# Patient Record
(demographics unavailable — no encounter records)

---

## 2024-12-11 NOTE — PHYSICAL EXAM
[Alert] : alert [Normocephalic] : normocephalic [Flat Open Anterior [place]] : flat open anterior fontanelle [PERRL] : PERRL [Red Reflex Bilateral] : red reflex bilateral [Normally Placed Ears] : normally placed ears [Auricles Well Formed] : auricles well formed [Clear Tympanic membranes] : clear tympanic membranes [Light reflex present] : light reflex present [Bony structures visible] : bony structures visible [Patent Auditory Canal] : patent auditory canal [Nares Patent] : nares patent [Palate Intact] : palate intact [Uvula Midline] : uvula midline [Supple, full passive range of motion] : supple, full passive range of motion [Symmetric Chest Rise] : symmetric chest rise [Clear to Auscultation Bilaterally] : clear to auscultation bilaterally [Regular Rate and Rhythm] : regular rate and rhythm [S1, S2 present] : S1, S2 present [+2 Femoral Pulses] : +2 femoral pulses [Soft] : soft [Bowel Sounds] : bowel sounds present [Umbilical Stump Dry, Clean, Intact] : umbilical stump dry, clean, intact [Normal external genitalia] : normal external genitalia [Patent Vagina] : patent vagina [Patent] : patent [Normally Placed] : normally placed [No Abnormal Lymph Nodes Palpated] : no abnormal lymph nodes palpated [Symmetric Flexed Extremities] : symmetric flexed extremities [Startle Reflex] : startle reflex present [Suck Reflex] : suck reflex present [Rooting] : rooting reflex present [Palmar Grasp] : palmar grasp present [Plantar Grasp] : plantar reflex present [Symmetric [place]] : symmetric [place] [Acute Distress] : no acute distress [Icteric sclera] : nonicteric sclera [Discharge] : no discharge [Palpable Masses] : no palpable masses [Tender] : nontender [Distended] : not distended [Hepatomegaly] : no hepatomegaly [Splenomegaly] : no splenomegaly [Clitoromegaly] : no clitoromegaly [Fisher-Ortolani] : negative Fisher-Ortolani [Spinal Dimple] : no spinal dimple [Tuft of Hair] : no tuft of hair [Jaundice] : not jaundice

## 2024-12-11 NOTE — HISTORY OF PRESENT ILLNESS
[Born at ___ Wks Gestation] : The patient was born at [unfilled] weeks gestation [] : via normal spontaneous vaginal delivery [[facility]] : at [facility] [BW: _____] : weight of [unfilled] [Length: _____] : length of [unfilled] [HC: _____] : head circumference of [unfilled] [DW: _____] : Discharge weight was [unfilled] [Age: ___] : [unfilled] year old mother [G: ___] : G [unfilled] [P: ___] : P [unfilled] [Significant Hx: ____] : The mother's  medical history is significant for [unfilled] [None] : There are no risk factors [Yes] : Yes [Breast milk] : breast milk [(1) _____] : [unfilled] [(5) _____] : [unfilled] [GBS] : GBS positive [MBT: ____] : MBT - [unfilled] [Antibiotics: ______] : antibiotics ([unfilled]) [TcB: _____] : Transcutaneous Bilirubin [unfilled] mg/dL [Other: ____] : [unfilled] [Hours between feeds ___] : Child is fed every [unfilled] hours [___ voids per day] : [unfilled] voids per day [Frequency of stools: ___] : Frequency of stools: [unfilled]  stools [per day] : per day. [Yellow] : yellow [In Bassinet/Crib] : sleeps in bassinet/crib [On back] : sleeps on back [Pacifier] : Uses pacifier [No] : No cigarette smoke exposure [Rear facing car seat in back seat] : Rear facing car seat in back seat [Carbon Monoxide Detectors] : Carbon monoxide detectors at home [Smoke Detectors] : Smoke detectors at home. [NO] : No [RSV vaccine] : RSV vaccine not received by mother at least 14 days prior to delivery [HepBsAG] : HepBsAg negative [HIV] : HIV negative [Rubella (Immune)] : Rubella not immune [VDRL/RPR (Reactive)] : VDRL/RPR nonreactive [] : negative [FreeTextEntry9] : O+ [de-identified] : 24 [FreeTextEntry8] : MURMUR HEARD AFTER 24HOL CARDIO F/U LATER THIS WEEK [Co-sleeping] : no co-sleeping [Loose bedding, pillow, toys, and/or bumpers in crib] : no loose bedding, pillow, toys, and/or bumpers in crib [Exposure to electronic nicotine delivery system] : No exposure to electronic nicotine delivery system [Hepatitis B Vaccine Given] : Hepatitis B vaccine not given [Nirsevimab Given] : Nirsevimab not given [FreeTextEntry7] : MOTHER (BRENNAN 23 YR UNEMPLOYED) FATHER (PERI, 28 YR , HAIR) SISTER (JEET 4 YR OLD ) [de-identified] : MOM SEES MILK, CAME IN YESTERDAY, FEELS SOFTER AFTER FEED

## 2024-12-11 NOTE — PLAN
[TextEntry] : REPEAT G6PD IN 2 MONTHS BREASTFEED Q2HR VIT D 400IU/DAY CARDIO THIS WEEK WEIGHT CHECK IN 1 WEEK WELL VISIT AT 1 MONTH

## 2024-12-11 NOTE — PHYSICAL EXAM
[Alert] : alert [Normocephalic] : normocephalic [Flat Open Anterior [place]] : flat open anterior fontanelle [PERRL] : PERRL [Red Reflex Bilateral] : red reflex bilateral [Normally Placed Ears] : normally placed ears [Auricles Well Formed] : auricles well formed [Clear Tympanic membranes] : clear tympanic membranes [Light reflex present] : light reflex present [Bony structures visible] : bony structures visible [Patent Auditory Canal] : patent auditory canal [Nares Patent] : nares patent [Palate Intact] : palate intact [Uvula Midline] : uvula midline [Supple, full passive range of motion] : supple, full passive range of motion [Symmetric Chest Rise] : symmetric chest rise [Clear to Auscultation Bilaterally] : clear to auscultation bilaterally [Regular Rate and Rhythm] : regular rate and rhythm [S1, S2 present] : S1, S2 present [+2 Femoral Pulses] : +2 femoral pulses [Soft] : soft [Bowel Sounds] : bowel sounds present [Umbilical Stump Dry, Clean, Intact] : umbilical stump dry, clean, intact [Normal external genitalia] : normal external genitalia [Patent Vagina] : patent vagina [Patent] : patent [Normally Placed] : normally placed [No Abnormal Lymph Nodes Palpated] : no abnormal lymph nodes palpated [Symmetric Flexed Extremities] : symmetric flexed extremities [Startle Reflex] : startle reflex present [Suck Reflex] : suck reflex present [Rooting] : rooting reflex present [Palmar Grasp] : palmar grasp present [Plantar Grasp] : plantar reflex present [Symmetric [place]] : symmetric [place] [Acute Distress] : no acute distress [Icteric sclera] : nonicteric sclera [Discharge] : no discharge [Palpable Masses] : no palpable masses [Tender] : nontender [Distended] : not distended [Hepatomegaly] : no hepatomegaly [Splenomegaly] : no splenomegaly [Clitoromegaly] : no clitoromegaly [Fisher-Ortolani] : negative Fisher-Ortolani [Spinal Dimple] : no spinal dimple [Tuft of Hair] : no tuft of hair [Jaundice] : not jaundice

## 2024-12-11 NOTE — HISTORY OF PRESENT ILLNESS
[Born at ___ Wks Gestation] : The patient was born at [unfilled] weeks gestation [] : via normal spontaneous vaginal delivery [[facility]] : at [facility] [BW: _____] : weight of [unfilled] [Length: _____] : length of [unfilled] [HC: _____] : head circumference of [unfilled] [DW: _____] : Discharge weight was [unfilled] [Age: ___] : [unfilled] year old mother [G: ___] : G [unfilled] [P: ___] : P [unfilled] [Significant Hx: ____] : The mother's  medical history is significant for [unfilled] [None] : There are no risk factors [Yes] : Yes [Breast milk] : breast milk [(1) _____] : [unfilled] [(5) _____] : [unfilled] [GBS] : GBS positive [MBT: ____] : MBT - [unfilled] [Antibiotics: ______] : antibiotics ([unfilled]) [TcB: _____] : Transcutaneous Bilirubin [unfilled] mg/dL [Other: ____] : [unfilled] [Hours between feeds ___] : Child is fed every [unfilled] hours [___ voids per day] : [unfilled] voids per day [Frequency of stools: ___] : Frequency of stools: [unfilled]  stools [per day] : per day. [Yellow] : yellow [In Bassinet/Crib] : sleeps in bassinet/crib [On back] : sleeps on back [Pacifier] : Uses pacifier [No] : No cigarette smoke exposure [Rear facing car seat in back seat] : Rear facing car seat in back seat [Carbon Monoxide Detectors] : Carbon monoxide detectors at home [Smoke Detectors] : Smoke detectors at home. [NO] : No [RSV vaccine] : RSV vaccine not received by mother at least 14 days prior to delivery [HepBsAG] : HepBsAg negative [HIV] : HIV negative [Rubella (Immune)] : Rubella not immune [VDRL/RPR (Reactive)] : VDRL/RPR nonreactive [] : negative [FreeTextEntry9] : O+ [de-identified] : 24 [FreeTextEntry8] : MURMUR HEARD AFTER 24HOL CARDIO F/U LATER THIS WEEK [Co-sleeping] : no co-sleeping [Loose bedding, pillow, toys, and/or bumpers in crib] : no loose bedding, pillow, toys, and/or bumpers in crib [Exposure to electronic nicotine delivery system] : No exposure to electronic nicotine delivery system [Hepatitis B Vaccine Given] : Hepatitis B vaccine not given [Nirsevimab Given] : Nirsevimab not given [FreeTextEntry7] : MOTHER (BRENNAN 23 YR UNEMPLOYED) FATHER (PERI, 28 YR , HAIR) SISTER (JEET 4 YR OLD ) [de-identified] : MOM SEES MILK, CAME IN YESTERDAY, FEELS SOFTER AFTER FEED

## 2024-12-11 NOTE — DISCUSSION/SUMMARY
[Normal Growth] : growth [Normal Development] : developmental [No Elimination Concerns] : elimination [Continue Regimen] : feeding [No Skin Concerns] : skin [Normal Sleep Pattern] : sleep [None] : no known medical problems [Anticipatory Guidance Given] : Anticipatory guidance addressed as per the history of present illness section [ Transition] :  transition [ Care] :  care [Nutritional Adequacy] : nutritional adequacy [Parental Well-Being] : parental well-being [Safety] : safety [No Medications] : ~He/She~ is not on any medications [Parent/Guardian] : Parent/Guardian [] : The components of the vaccine(s) to be administered today are listed in the plan of care. The disease(s) for which the vaccine(s) are intended to prevent and the risks have been discussed with the caretaker.  The risks are also included in the appropriate vaccination information statements which have been provided to the patient's caregiver.  The caregiver has given consent to vaccinate. [Hepatitis B In Hospital] : Hepatitis B not administered while in the hospital [FreeTextEntry1] : Recommend exclusive breastfeeding, 8-12 feedings per day. Mother should continue prenatal vitamins and avoid alcohol. If formula is needed, recommend iron-fortified formulations every 2-3 hrs. When in car, patient should be in rear-facing car seat in back seat. Air dry umbillical stump. Put baby to sleep on back, in own crib with no loose or soft bedding. Limit baby's exposure to others, especially those with fever or unknown vaccine status. CALL ASAP FOR RED, WHITE/GREY OR BLACK STOOLS TO ER FOR RECTAL TEMP 100.4 OR MORE AT AGE LESS THAN 60 DOL  Vaccine(s) given today: HEPATITIS B AND BEYFORTUS  The potential side effects of today's vaccine(s) and the risks of disease(s) which they are intended to prevent have been discussed with the caretaker.  The caretaker has given consent to vaccinate.

## 2024-12-12 NOTE — PAST MEDICAL HISTORY
[At Term] : at term [Birth Weight:___] : [unfilled] weighed [unfilled] at birth. [Normal Vaginal Route] : by normal vaginal route [Apgar Scores: ___] : Apgar Scores: [unfilled] [None] : No maternal complications [FreeTextEntry1] : murmur nursery

## 2024-12-12 NOTE — CARDIOLOGY SUMMARY
[Today's Date] : [unfilled] [FreeTextEntry1] : 15-lead electrocardiogram demonstrated normal sinus rhythm at a rate of 167 beats per minute with right axis deviation. There was no evidence of chamber dilation or hypertrophy.  All intervals were within normal limits for age.  [FreeTextEntry2] : Two-dimensional transthoracic echocardiogram with Doppler assessment demonstrated normal intracardiac anatomy including a patent foramen ovale and a trivial patent ductus arteriosus with left to right flow.  There were normal flow profiles across all cardiac valves.  There was normal biventricular systolic function and no pericardial effusion.  Right and left coronary artery origins were normal.

## 2024-12-12 NOTE — HISTORY OF PRESENT ILLNESS
[FreeTextEntry1] : BRADLEY is a 5 day old female who was referred for cardiology consultation due to a heart murmur. The murmur was first diagnosed during the  period prior to hospital discharge.  BRADLEY was not ill or febrile at the time. She has been breast feeding at home without difficulty. There has been no tachypnea, increased work of breathing, cyanosis, excessive diaphoresis, unexplained irritability, or syncope.

## 2024-12-12 NOTE — DISCUSSION/SUMMARY
[FreeTextEntry1] : In summary, RBADLEY was seen today due to a heart murmur. The murmur was first diagnosed during the  period prior to hospital discharge.  Her history, examination, EKG and echocardiogram today are reassuring.  No murmur was heard today.  Echocardiogram shows a patent foramen ovale with left to right flow which is a normal finding at this age and a trivial patent ductus arteriosus that is also a normal finding at this age and likely to continue to resolve spontaneously.  The murmur that was heard in the hospital was likely from the closing ductus arteriosus.  I discussed with her parents that her cardiac evaluation is normal for her age.  She continues to require no limitations to her medical care or activity on a cardiac basis. Subacute bacterial endocarditis prophylaxis is not indicated and routine cardiology follow up is not indicated unless there is a change in her clinical status. [Needs SBE Prophylaxis] : [unfilled] does not need bacterial endocarditis prophylaxis [May participate in all age-appropriate activities] : [unfilled] May participate in all age-appropriate activities.

## 2024-12-12 NOTE — CONSULT LETTER
[Today's Date] : [unfilled] [Name] : Name: [unfilled] [] : : ~~ [Today's Date:] : [unfilled] [Dear  ___:] : Dear Dr. [unfilled]: [Consult] : I had the pleasure of evaluating your patient, [unfilled]. My full evaluation follows. [Consult - Single Provider] : Thank you very much for allowing me to participate in the care of this patient. If you have any questions, please do not hesitate to contact me. [Sincerely,] : Sincerely, [FreeTextEntry4] : Rola Trinidad MD [FreeTextEntry5] : [address] [FreeTextEntry6] : [place] [address] [de-identified] : Tyesha Kim MD [facility] Attending Physician, Pediatric Cardiology Director, Fetal Cardiology [facility]  [facility] [employer] at [facility]

## 2024-12-20 NOTE — PHYSICAL EXAM
[Regular Rate and Rhythm] : regular rate and rhythm [Normal S1, S2 audible] : normal S1, S2 audible [Tachycardia] : no tachycardia [Drake: ____] : Drake [unfilled] [Normal External Genitalia] : normal external genitalia [NL] : warm, clear [FreeTextEntry8] : MURMUR LLSB [FreeTextEntry9] : SMALL SCAB REMNANT OF UMBILICAL STUMP REMOVED, +GRANULOMA--->AGNO3 X 1

## 2024-12-27 NOTE — DISCUSSION/SUMMARY
[FreeTextEntry1] : Acute URI - Stressed fever preacutions - close monitoring of breathing and wet diapers - RVP/COVID pending, quarantine until results - Reviewed Return precautions

## 2024-12-27 NOTE — HISTORY OF PRESENT ILLNESS
[de-identified] : vomiting, congestion, trouble breathing. [FreeTextEntry6] : yesterday started with congestion, coughing.  trouble feeding due to congestion No Fever wet diapers baseline  + sick contac at home

## 2024-12-27 NOTE — PHYSICAL EXAM
[No Acute Distress] : no acute distress [Alert] : alert [Normocephalic] : normocephalic [NL] : regular rate and rhythm, normal S1, S2 audible, no murmurs [Soft] : soft [de-identified] : MMM [FreeTextEntry7] : Equal air entry, clear lung sounds b/l, no wheezing, crackles or retractions

## 2025-01-16 NOTE — DEVELOPMENTAL MILESTONES
[Normal Development] : Normal Development [Calms when picked up or spoken to] : calms when picked up or spoken to [Looks briefly at objects] : looks briefly at objects [Makes brief short vowel sounds] : makes brief short vowel sounds [Holds chin up in prone] : holds chin up in prone [Holds fingers more open at rest] : holds fingers more open at rest

## 2025-01-17 NOTE — HISTORY OF PRESENT ILLNESS
[Mother] : mother [Breast milk] : breast milk [Hours between feeds ___] : Child is fed every [unfilled] hours [Normal] : Normal [Frequency of stools: ___] : Frequency of stools: [unfilled]  stools [Yellow] : yellow [Loose] : loose consistency [In Bassinet/Crib] : sleeps in bassinet/crib [Loose bedding, pillow, toys, and/or bumpers in crib] : loose bedding, pillow, toys, and/or bumpers in crib [No] : No cigarette smoke exposure [Rear facing car seat in back seat] : Rear facing car seat in back seat [Carbon Monoxide Detectors] : Carbon monoxide detectors at home [Smoke Detectors] : Smoke detectors at home. [Vitamins ___] : no vitamins [On back] : does not sleep on back [Co-sleeping] : no co-sleeping [Pacifier use] : not using pacifier [Exposure to electronic nicotine delivery system] : No exposure to electronic nicotine delivery system [FreeTextEntry7] : RUNNY/STUFFY NOSE X PAST SEVERAL DAYS, NO COUGH OR FEVER, GETTNG BETTER.  NEEDS REPEAT NB SCREEN [de-identified] : RED MAITE ON THIGH [FreeTextEntry3] : UP MORE AT NIGHT THAN DAY [FreeTextEntry9] : home

## 2025-01-17 NOTE — PHYSICAL EXAM
[Alert] : alert [Normocephalic] : normocephalic [Flat Open Anterior [place]] : flat open anterior fontanelle [PERRL] : PERRL [Red Reflex Bilateral] : red reflex bilateral [Normally Placed Ears] : normally placed ears [Auricles Well Formed] : auricles well formed [Clear Tympanic membranes] : clear tympanic membranes [Light reflex present] : light reflex present [Bony landmarks visible] : bony landmarks visible [Nares Patent] : nares patent [Palate Intact] : palate intact [Uvula Midline] : uvula midline [Supple, full passive range of motion] : supple, full passive range of motion [Symmetric Chest Rise] : symmetric chest rise [Clear to Auscultation Bilaterally] : clear to auscultation bilaterally [Regular Rate and Rhythm] : regular rate and rhythm [S1, S2 present] : S1, S2 present [+2 Femoral Pulses] : +2 femoral pulses [Soft] : soft [Bowel Sounds] : bowel sounds present [Normal external genitailia] : normal external genitalia [Patent Vagina] : vagina patent [Normally Placed] : normally placed [No Abnormal Lymph Nodes Palpated] : no abnormal lymph nodes palpated [Symmetric Flexed Extremities] : symmetric flexed extremities [Startle Reflex] : startle reflex present [Suck Reflex] : suck reflex present [Rooting] : rooting reflex present [Palmar Grasp] : palmar grasp reflex present [Plantar Grasp] : plantar grasp reflex present [Symmetric [place]] : symmetric [place] [Acute Distress] : no acute distress [Discharge] : no discharge [Palpable Masses] : no palpable masses [Murmurs] : no murmurs [Tender] : nontender [Distended] : not distended [Hepatomegaly] : no hepatomegaly [Splenomegaly] : no splenomegaly [Clitoromegaly] : no clitoromegaly [Fisher-Ortolani] : negative Fisher-Ortolani [Spinal Dimple] : no spinal dimple [Tuft of Hair] : no tuft of hair [Jaundice] : no jaundice [FreeTextEntry4] : STERTOR [FreeTextEntry7] : TRANSMITTED UPPER AIRWAY SOUNDS, CLEAR LUNGS [FreeTextEntry9] : SMALL REDUCIBLE UMBILICAL HERNIA [de-identified] : RIGHT LATERAL THIGH INFANTILE HEMANGIOMA

## 2025-01-17 NOTE — DISCUSSION/SUMMARY
[Normal Growth] : growth [Normal Development] : development  [No Elimination Concerns] : elimination [Continue Regimen] : feeding [No Skin Concerns] : skin [Normal Sleep Pattern] : sleep [None] : no medical problems [Anticipatory Guidance Given] : Anticipatory guidance addressed as per the history of present illness section [Parental Well-Being] : parental well-being [Family Adjustment] : family adjustment [Feeding Routines] : feeding routines [Infant Adjustment] : infant adjustment [Safety] : safety [No Medications] : ~He/She~ is not on any medications [Parent/Guardian] : Parent/Guardian [] : The components of the vaccine(s) to be administered today are listed in the plan of care. The disease(s) for which the vaccine(s) are intended to prevent and the risks have been discussed with the caretaker.  The risks are also included in the appropriate vaccination information statements which have been provided to the patient's caregiver.  The caregiver has given consent to vaccinate. [FreeTextEntry1] : Recommend exclusive breastfeeding, 8-12 feedings per day. Mother should continue prenatal vitamins and avoid alcohol. If formula is needed, recommend iron-fortified formulations, 2-4 oz every 2-3 hrs. When in car, patient should be in rear-facing car seat in back seat. Put baby to sleep on back, in own crib with no loose or soft bedding. Help baby to develop sleep and feeding routines. May offer pacifier if needed. Start tummy time when awake. Limit baby's exposure to others, especially those with fever or unknown vaccine status. Parents counseled to call if rectal temperature >100.4 degrees F.  Vaccine(s) given today: HEPATITIS B  The potential side effects of today's vaccine(s) and the risks of disease(s) which they are intended to prevent have been discussed with the caretaker.  The caretaker has given consent to vaccinate.

## 2025-01-17 NOTE — HISTORY OF PRESENT ILLNESS
[Mother] : mother [Breast milk] : breast milk [Hours between feeds ___] : Child is fed every [unfilled] hours [Normal] : Normal [Frequency of stools: ___] : Frequency of stools: [unfilled]  stools [Yellow] : yellow [Loose] : loose consistency [In Bassinet/Crib] : sleeps in bassinet/crib [Loose bedding, pillow, toys, and/or bumpers in crib] : loose bedding, pillow, toys, and/or bumpers in crib [No] : No cigarette smoke exposure [Rear facing car seat in back seat] : Rear facing car seat in back seat [Carbon Monoxide Detectors] : Carbon monoxide detectors at home [Smoke Detectors] : Smoke detectors at home. [Vitamins ___] : no vitamins [On back] : does not sleep on back [Co-sleeping] : no co-sleeping [Pacifier use] : not using pacifier [Exposure to electronic nicotine delivery system] : No exposure to electronic nicotine delivery system [FreeTextEntry7] : RUNNY/STUFFY NOSE X PAST SEVERAL DAYS, NO COUGH OR FEVER, GETTNG BETTER.  NEEDS REPEAT NB SCREEN [de-identified] : RED MAITE ON THIGH [FreeTextEntry3] : UP MORE AT NIGHT THAN DAY [FreeTextEntry9] : home

## 2025-01-17 NOTE — PHYSICAL EXAM
[Alert] : alert [Normocephalic] : normocephalic [Flat Open Anterior [place]] : flat open anterior fontanelle [PERRL] : PERRL [Red Reflex Bilateral] : red reflex bilateral [Normally Placed Ears] : normally placed ears [Auricles Well Formed] : auricles well formed [Clear Tympanic membranes] : clear tympanic membranes [Light reflex present] : light reflex present [Bony landmarks visible] : bony landmarks visible [Nares Patent] : nares patent [Palate Intact] : palate intact [Uvula Midline] : uvula midline [Supple, full passive range of motion] : supple, full passive range of motion [Symmetric Chest Rise] : symmetric chest rise [Clear to Auscultation Bilaterally] : clear to auscultation bilaterally [Regular Rate and Rhythm] : regular rate and rhythm [S1, S2 present] : S1, S2 present [+2 Femoral Pulses] : +2 femoral pulses [Soft] : soft [Bowel Sounds] : bowel sounds present [Normal external genitailia] : normal external genitalia [Patent Vagina] : vagina patent [Normally Placed] : normally placed [No Abnormal Lymph Nodes Palpated] : no abnormal lymph nodes palpated [Symmetric Flexed Extremities] : symmetric flexed extremities [Startle Reflex] : startle reflex present [Suck Reflex] : suck reflex present [Rooting] : rooting reflex present [Palmar Grasp] : palmar grasp reflex present [Plantar Grasp] : plantar grasp reflex present [Symmetric [place]] : symmetric [place] [Acute Distress] : no acute distress [Discharge] : no discharge [Palpable Masses] : no palpable masses [Murmurs] : no murmurs [Tender] : nontender [Distended] : not distended [Hepatomegaly] : no hepatomegaly [Splenomegaly] : no splenomegaly [Clitoromegaly] : no clitoromegaly [Fisher-Ortolani] : negative Fisher-Ortolani [Spinal Dimple] : no spinal dimple [Tuft of Hair] : no tuft of hair [Jaundice] : no jaundice [FreeTextEntry4] : STERTOR [FreeTextEntry7] : TRANSMITTED UPPER AIRWAY SOUNDS, CLEAR LUNGS [FreeTextEntry9] : SMALL REDUCIBLE UMBILICAL HERNIA [de-identified] : RIGHT LATERAL THIGH INFANTILE HEMANGIOMA

## 2025-02-26 NOTE — HISTORY OF PRESENT ILLNESS
[Parents] : parents [Breast milk] : breast milk [Hours between feeds ___] : Child is fed every [unfilled] hours [Vitamins ___] : Patient takes [unfilled] vitamins daily [Normal] : Normal [Frequency of stools: ___] : Frequency of stools: [unfilled]  stools [Green/brown] : green/brown [Seedy] : seedy [In Bassinet/Crib] : sleeps in bassinet/crib [On back] : sleeps on back [No] : No cigarette smoke exposure [Rear facing car seat in back seat] : Rear facing car seat in back seat [Carbon Monoxide Detectors] : Carbon monoxide detectors at home [Smoke Detectors] : Smoke detectors at home. [Co-sleeping] : no co-sleeping [Loose bedding, pillow, toys, and/or bumpers in crib] : no loose bedding, pillow, toys, and/or bumpers in crib [Pacifier use] : not using pacifier [Exposure to electronic nicotine delivery system] : No exposure to electronic nicotine delivery system [de-identified] : WON'T TAKE BOTTLE EVEN FROM FATHER.  HAVE TRIED 2 DIFFERENT BOTTLES AND EVEN SYRINGE.  BABY WILL SPIT IT OUT EVEN EBM.   [FreeTextEntry9] : home

## 2025-02-26 NOTE — DEVELOPMENTAL MILESTONES
[Smiles responsively] : smiles responsively [Vocalizes with simple cooing] : vocalizes with simple cooing [Lifts head and chest in prone] : lifts head and chest in prone [Opens and shuts hands] : opens and shuts hands [FreeTextEntry1] : TRACKING

## 2025-02-26 NOTE — HISTORY OF PRESENT ILLNESS
[Parents] : parents [Breast milk] : breast milk [Hours between feeds ___] : Child is fed every [unfilled] hours [Vitamins ___] : Patient takes [unfilled] vitamins daily [Normal] : Normal [Frequency of stools: ___] : Frequency of stools: [unfilled]  stools [Green/brown] : green/brown [Seedy] : seedy [In Bassinet/Crib] : sleeps in bassinet/crib [On back] : sleeps on back [No] : No cigarette smoke exposure [Rear facing car seat in back seat] : Rear facing car seat in back seat [Carbon Monoxide Detectors] : Carbon monoxide detectors at home [Smoke Detectors] : Smoke detectors at home. [Co-sleeping] : no co-sleeping [Loose bedding, pillow, toys, and/or bumpers in crib] : no loose bedding, pillow, toys, and/or bumpers in crib [Pacifier use] : not using pacifier [Exposure to electronic nicotine delivery system] : No exposure to electronic nicotine delivery system [de-identified] : WON'T TAKE BOTTLE EVEN FROM FATHER.  HAVE TRIED 2 DIFFERENT BOTTLES AND EVEN SYRINGE.  BABY WILL SPIT IT OUT EVEN EBM.   [FreeTextEntry9] : home

## 2025-02-26 NOTE — PHYSICAL EXAM
[Alert] : alert [Normocephalic] : normocephalic [Flat Open Anterior [place]] : flat open anterior fontanelle [PERRL] : PERRL [Red Reflex Bilateral] : red reflex bilateral [Normally Placed Ears] : normally placed ears [Auricles Well Formed] : auricles well formed [Clear Tympanic membranes] : clear tympanic membranes [Light reflex present] : light reflex present [Bony landmarks visible] : bony landmarks visible [Nares Patent] : nares patent [Palate Intact] : palate intact [Uvula Midline] : uvula midline [Supple, full passive range of motion] : supple, full passive range of motion [Symmetric Chest Rise] : symmetric chest rise [Clear to Auscultation Bilaterally] : clear to auscultation bilaterally [Regular Rate and Rhythm] : regular rate and rhythm [S1, S2 present] : S1, S2 present [+2 Femoral Pulses] : +2 femoral pulses [Soft] : soft [Bowel Sounds] : bowel sounds present [Normal external genitailia] : normal external genitalia [Patent Vagina] : vagina patent [Normally Placed] : normally placed [No Abnormal Lymph Nodes Palpated] : no abnormal lymph nodes palpated [Symmetric Flexed Extremities] : symmetric flexed extremities [Startle Reflex] : startle reflex present [Suck Reflex] : suck reflex present [Rooting] : rooting reflex present [Palmar Grasp] : palmar grasp reflex present [Plantar Grasp] : plantar grasp reflex present [Symmetric [place]] : symmetric [place] [Acute Distress] : no acute distress [Discharge] : no discharge [Palpable Masses] : no palpable masses [Murmurs] : no murmurs [Tender] : nontender [Distended] : not distended [Hepatomegaly] : no hepatomegaly [Splenomegaly] : no splenomegaly [Clitoromegaly] : no clitoromegaly [Fisher-Ortolani] : negative Fisher-Ortolani [Spinal Dimple] : no spinal dimple [Tuft of Hair] : no tuft of hair [Rash and/or lesion present] : no rash/lesion [de-identified] : NOISY BREATHING-STERTOR VS INSPIRATORY STRIDOR, DISSIPATES WHEN PRONE [FreeTextEntry9] : SMALL UMBILICAL HERNIA

## 2025-02-26 NOTE — DISCUSSION/SUMMARY
[Normal Growth] : growth [Normal Development] : development  [No Elimination Concerns] : elimination [Continue Regimen] : feeding [No Skin Concerns] : skin [Normal Sleep Pattern] : sleep [None] : no medical problems [Anticipatory Guidance Given] : Anticipatory guidance addressed as per the history of present illness section [Parental (Maternal) Well-Being] : parental (maternal) well-being [Infant-Family Synchrony] : infant-family synchrony [Nutritional Adequacy] : nutritional adequacy [Infant Behavior] : infant behavior [Safety] : safety [No Medications] : ~He/She~ is not on any medications [Parent/Guardian] : Parent/Guardian [FreeTextEntry1] : Recommend exclusive breastfeeding, 8-12 feedings per day. Mother should continue prenatal vitamins and avoid alcohol. If formula is needed, recommend iron-fortified formulations, 2-4 oz every 3-4 hrs. When in car, patient should be in rear-facing car seat in back seat. Put baby to sleep on back, in own crib with no loose or soft bedding. Help baby to maintain sleep and feeding routines. May offer pacifier if needed. Continue tummy time when awake. Parents counseled to call if rectal temperature >100.4 degrees F.  PARENTS DECLINE ALL VACCINES TODAY DEESPITE EXTENSIVE COUNSELING.  DISCUSSED THAT PRACTICE POLICY IS THAT PRIMARY SERIES MUST BE STARTED BY 6 MONTHS OF AGE.  VACCINE DECLINATION/DEFERRAL FORM SIGNED AND SCANNED IN TO CHART. SAFETY, BENEFITS AND IMPORTANCE OF VACCINES DISCUSSED AT LENGTH.  RISKS OF DECLINING OR DEFERRING VACCINES WERE DISCUSSED INCLUDING BUT NOT LIMITED TO DISABILITY AND DEATH.

## 2025-02-26 NOTE — PHYSICAL EXAM
[Alert] : alert [Normocephalic] : normocephalic [Flat Open Anterior [place]] : flat open anterior fontanelle [PERRL] : PERRL [Red Reflex Bilateral] : red reflex bilateral [Normally Placed Ears] : normally placed ears [Auricles Well Formed] : auricles well formed [Clear Tympanic membranes] : clear tympanic membranes [Light reflex present] : light reflex present [Bony landmarks visible] : bony landmarks visible [Nares Patent] : nares patent [Palate Intact] : palate intact [Uvula Midline] : uvula midline [Supple, full passive range of motion] : supple, full passive range of motion [Symmetric Chest Rise] : symmetric chest rise [Clear to Auscultation Bilaterally] : clear to auscultation bilaterally [Regular Rate and Rhythm] : regular rate and rhythm [S1, S2 present] : S1, S2 present [+2 Femoral Pulses] : +2 femoral pulses [Soft] : soft [Bowel Sounds] : bowel sounds present [Normal external genitailia] : normal external genitalia [Patent Vagina] : vagina patent [Normally Placed] : normally placed [No Abnormal Lymph Nodes Palpated] : no abnormal lymph nodes palpated [Symmetric Flexed Extremities] : symmetric flexed extremities [Startle Reflex] : startle reflex present [Suck Reflex] : suck reflex present [Rooting] : rooting reflex present [Palmar Grasp] : palmar grasp reflex present [Plantar Grasp] : plantar grasp reflex present [Symmetric [place]] : symmetric [place] [Acute Distress] : no acute distress [Discharge] : no discharge [Palpable Masses] : no palpable masses [Murmurs] : no murmurs [Tender] : nontender [Distended] : not distended [Hepatomegaly] : no hepatomegaly [Splenomegaly] : no splenomegaly [Clitoromegaly] : no clitoromegaly [Fisher-Ortolani] : negative Fisher-Ortolani [Spinal Dimple] : no spinal dimple [Tuft of Hair] : no tuft of hair [Rash and/or lesion present] : no rash/lesion [de-identified] : NOISY BREATHING-STERTOR VS INSPIRATORY STRIDOR, DISSIPATES WHEN PRONE [FreeTextEntry9] : SMALL UMBILICAL HERNIA

## 2025-04-09 NOTE — HISTORY OF PRESENT ILLNESS
[Co-sleeping] : no co-sleeping [Sleeps 12-16 hours per 24 hours (including naps)] : Does not sleep 12-16 hours per 24 hours (including naps) [Loose bedding, pillow, toys, and/or bumpers in crib] : no loose bedding, pillow, toys, and/or bumpers in crib [Pacifier use] : not using pacifier [Exposure to electronic nicotine delivery system] : No exposure to electronic nicotine delivery system [FreeTextEntry7] : G6PD WAS 5.6 (BORDERELINE/INTERMIEDIATE) AT BIRTH, PLAN TO RECHECK [FreeTextEntry3] : WAKES ONCE TO FEED [FreeTextEntry9] : home

## 2025-04-09 NOTE — DISCUSSION/SUMMARY
[Normal Growth] : growth [Normal Development] : development  [No Elimination Concerns] : elimination [Continue Regimen] : feeding [No Skin Concerns] : skin [Normal Sleep Pattern] : sleep [None] : no medical problems [Anticipatory Guidance Given] : Anticipatory guidance addressed as per the history of present illness section [Family Functioning] : family functioning [Nutritional Adequacy and Growth] : nutritional adequacy and growth [Infant Development] : infant development [Oral Health] : oral health [Safety] : safety [No Medications] : ~He/She~ is not on any medications [Parent/Guardian] : Parent/Guardian [] : The components of the vaccine(s) to be administered today are listed in the plan of care. The disease(s) for which the vaccine(s) are intended to prevent and the risks have been discussed with the caretaker.  The risks are also included in the appropriate vaccination information statements which have been provided to the patient's caregiver.  The caregiver has given consent to vaccinate. [FreeTextEntry1] : Recommend breastfeeding, 8-12 feedings per day. Mother should continue prenatal vitamins and avoid alcohol. If formula is needed, recommend iron-fortified formulations, 2-4 oz every 3-4 hrs. Cereal may be introduced using a spoon and bowl. When in car, patient should be in rear-facing car seat in back seat. Put baby to sleep on back, in own crib with no loose or soft bedding. Lower crib matress. Help baby to maintain sleep and feeding routines. May offer pacifier if needed. Continue tummy time when awake.  Vaccine(s) given today: PENTACEL, PREVNAR AND ROTA  The potential side effects of today's vaccine(s) and the risks of disease(s) which they are intended to prevent have been discussed with the caretaker.  The caretaker has given consent to vaccinate.

## 2025-04-09 NOTE — PHYSICAL EXAM
[Alert] : alert [Normocephalic] : normocephalic [Flat Open Anterior [place]] : flat open anterior fontanelle [Red Reflex] : red reflex bilateral [PERRL] : PERRL [Normally Placed Ears] : normally placed ears [Auricles Well Formed] : auricles well formed [Clear Tympanic membranes] : clear tympanic membranes [Light reflex present] : light reflex present [Bony landmarks visible] : bony landmarks visible [Nares Patent] : nares patent [Palate Intact] : palate intact [Uvula Midline] : uvula midline [Symmetric Chest Rise] : symmetric chest rise [Clear to Auscultation Bilaterally] : clear to auscultation bilaterally [Regular Rate and Rhythm] : regular rate and rhythm [S1, S2 present] : S1, S2 present [+2 Femoral Pulses] : (+) 2 femoral pulses [Soft] : soft [Bowel Sounds] : bowel sounds present [Normal External Genitalia] : normal external genitalia [Normal Vaginal Introitus] : normal vaginal introitus [Patent] : patent [Normally Placed] : normally placed [No Abnormal Lymph Nodes Palpated] : no abnormal lymph nodes palpated [Startle Reflex] : startle reflex present [Plantar Grasp] : plantar grasp reflex present [Symmetric [place]] : symmetric [place] [Acute Distress] : no acute distress [Discharge] : no discharge [Palpable Masses] : no palpable masses [Murmurs] : no murmurs [Tender] : nontender [Distended] : nondistended [Hepatomegaly] : no hepatomegaly [Splenomegaly] : no splenomegaly [Clitoromegaly] : no clitoromegaly [Fisher-Ortolani] : negative Fisher-Ortolani [Allis Sign] : negative Allis sign [Spinal Dimple] : no spinal dimple [Tuft of Hair] : no tuft of hair [FreeTextEntry4] : NOISY UPPER AIRWAY BREATHING [FreeTextEntry9] : SMALL REDUCIBLE UMBILICAL HERNIA [de-identified] : ERYTHEMATOUS DIAPER DERM WITH PATCHES OF PAPULES

## 2025-04-09 NOTE — PHYSICAL EXAM
[Alert] : alert [Normocephalic] : normocephalic [Flat Open Anterior [place]] : flat open anterior fontanelle [Red Reflex] : red reflex bilateral [PERRL] : PERRL [Normally Placed Ears] : normally placed ears [Auricles Well Formed] : auricles well formed [Clear Tympanic membranes] : clear tympanic membranes [Light reflex present] : light reflex present [Bony landmarks visible] : bony landmarks visible [Nares Patent] : nares patent [Palate Intact] : palate intact [Uvula Midline] : uvula midline [Symmetric Chest Rise] : symmetric chest rise [Clear to Auscultation Bilaterally] : clear to auscultation bilaterally [Regular Rate and Rhythm] : regular rate and rhythm [S1, S2 present] : S1, S2 present [+2 Femoral Pulses] : (+) 2 femoral pulses [Soft] : soft [Bowel Sounds] : bowel sounds present [Normal External Genitalia] : normal external genitalia [Normal Vaginal Introitus] : normal vaginal introitus [Patent] : patent [Normally Placed] : normally placed [No Abnormal Lymph Nodes Palpated] : no abnormal lymph nodes palpated [Startle Reflex] : startle reflex present [Plantar Grasp] : plantar grasp reflex present [Symmetric [place]] : symmetric [place] [Acute Distress] : no acute distress [Discharge] : no discharge [Palpable Masses] : no palpable masses [Murmurs] : no murmurs [Tender] : nontender [Distended] : nondistended [Hepatomegaly] : no hepatomegaly [Splenomegaly] : no splenomegaly [Clitoromegaly] : no clitoromegaly [Fisher-Ortolani] : negative Fisher-Ortolani [Allis Sign] : negative Allis sign [Spinal Dimple] : no spinal dimple [Tuft of Hair] : no tuft of hair [FreeTextEntry4] : NOISY UPPER AIRWAY BREATHING [FreeTextEntry9] : SMALL REDUCIBLE UMBILICAL HERNIA [de-identified] : ERYTHEMATOUS DIAPER DERM WITH PATCHES OF PAPULES

## 2025-06-13 NOTE — HISTORY OF PRESENT ILLNESS
[Breast milk] : breast milk [Formula ___ oz/feed] : [unfilled] oz of formula per feed [Normal] : Normal [In Bassinet/Crib] : sleeps in bassinet/crib [Tummy time] : tummy time [No] : No cigarette smoke exposure [Rear facing car seat in back seat] : Rear facing car seat in back seat [Smoke Detectors] : Smoke detectors at home. [de-identified] : HOME

## 2025-06-13 NOTE — PHYSICAL EXAM
[Alert] : alert [Normocephalic] : normocephalic [Flat Open Anterior [place]] : flat open anterior fontanelle [Red Reflex] : red reflex bilateral [PERRL] : PERRL [Normally Placed Ears] : normally placed ears [Auricles Well Formed] : auricles well formed [Clear Tympanic membranes] : clear tympanic membranes [Light reflex present] : light reflex present [Bony landmarks visible] : bony landmarks visible [Nares Patent] : nares patent [Palate Intact] : palate intact [Uvula Midline] : uvula midline [Supple, full passive range of motion] : supple, full passive range of motion [Symmetric Chest Rise] : symmetric chest rise [Clear to Auscultation Bilaterally] : clear to auscultation bilaterally [Regular Rate and Rhythm] : regular rate and rhythm [S1, S2 present] : S1, S2 present [+2 Femoral Pulses] : (+) 2 femoral pulses [Soft] : soft [Bowel Sounds] : bowel sounds present [Normal External Genitalia] : normal external genitalia [Normal Vaginal Introitus] : normal vaginal introitus [Patent] : patent [Normally Placed] : normally placed [No Abnormal Lymph Nodes Palpated] : no abnormal lymph nodes palpated [Symmetric Buttocks Creases] : symmetric buttocks creases [Plantar Grasp] : plantar grasp reflex present [Cranial Nerves Grossly Intact] : cranial nerves grossly intact [Acute Distress] : no acute distress [Discharge] : no discharge [Tooth Eruption] : no tooth eruption [Palpable Masses] : no palpable masses [Murmurs] : no murmurs [Tender] : nontender [Distended] : nondistended [Hepatomegaly] : no hepatomegaly [Splenomegaly] : no splenomegaly [Clitoromegaly] : no clitoromegaly [Fisher-Ortolani] : negative Fisher-Ortolani [Allis Sign] : negative Allis sign [Spinal Dimple] : no spinal dimple [Tuft of Hair] : no tuft of hair [Rash or Lesions] : no rash/lesions [de-identified] : REDUCIBLE UMBILICAL HERNIA [de-identified] : ANTERIOR UPPER THORACIC AREA OF HYPOPIGMENTATION (2X3CM); ANTERIOR NECK AREA [place] ((3X7CM); R UPPER THIGH WITH HEMANGIOMA (LESS RED THAN PREVIOUS PER PARENTS)

## 2025-06-13 NOTE — DEVELOPMENTAL MILESTONES
[Normal Development] : Normal Development [None] : none [Pats or smiles at reflection] : pats or smiles at reflection [Begins to turn when name called] : begins to turn when name called [Babbles] : babbles [Rolls over prone to supine] : rolls over prone to supine [Sits briefly without support] : sits briefly without support [Reaches for object and transfers] : reaches for object and transfers [Rakes small object with 4 fingers] : rakes small object with 4 fingers [Passed] : passed [FreeTextEntry2] : 0

## 2025-07-26 NOTE — HISTORY OF PRESENT ILLNESS
[de-identified] : RASH ALL OVER SINCE THIS MORNING [FreeTextEntry6] : Tmax = 102F x4d ago seen in ER 2d ago, (+) u/a --> u/c now >100k e coli, sens pend on cephalexin, w/resolving of fever now w/body and face rash, does not seem to be itchy in good mood and back to drinking and eating well

## 2025-07-26 NOTE — PHYSICAL EXAM
[Tired appearing] : not tired appearing [Lethargic] : not lethargic [Irritable] : not irritable [Playful] : playful [NL] : normotonic [de-identified] : flat subcutaneous nonblanching  macular eruption on trunk, face and proximal limbs, nonpruritic